# Patient Record
(demographics unavailable — no encounter records)

---

## 2025-06-11 NOTE — HISTORY OF PRESENT ILLNESS
[FreeTextEntry1] : Pt was in office 1 week ago for evaluation of abscess right breast  Patient has right axillary pain for about 3 days  Pt states right breast abscess continues to slightly drain  She was originally scheduled for ultrasound-guided core biopsy of right breast mass last week

## 2025-06-15 NOTE — PHYSICAL EXAM
[Normocephalic] : normocephalic [Atraumatic] : atraumatic [Supple] : supple [No Supraclavicular Adenopathy] : no supraclavicular adenopathy [Examined in the supine and seated position] : examined in the supine and seated position [No dominant masses] : no dominant masses left breast [No Nipple Retraction] : no left nipple retraction [No Nipple Discharge] : no left nipple discharge [No Axillary Lymphadenopathy] : no left axillary lymphadenopathy [No Edema] : no edema [No Rashes] : no rashes [No Ulceration] : no ulceration [de-identified] : Palpable mass lower inner right breast likely abscess

## 2025-06-15 NOTE — HISTORY OF PRESENT ILLNESS
[FreeTextEntry1] : Patient presents to the office for evaluation of abscess right breast lower inner quadrant  She was originally scheduled for ultrasound-guided core biopsy of the retroareolar right breast mass

## 2025-06-15 NOTE — PROCEDURE
[FreeTextEntry3] : After informed consent was obtained, sterile prep and drape was applied to the skin of the lower inner right breast  The abscess cavity was opened and drained  The patient tolerated the procedure well and was instructed in postop care

## 2025-06-15 NOTE — PHYSICAL EXAM
[Normocephalic] : normocephalic [Atraumatic] : atraumatic [Supple] : supple [No Supraclavicular Adenopathy] : no supraclavicular adenopathy [Examined in the supine and seated position] : examined in the supine and seated position [No dominant masses] : no dominant masses left breast [No Nipple Retraction] : no left nipple retraction [No Nipple Discharge] : no left nipple discharge [No Axillary Lymphadenopathy] : no left axillary lymphadenopathy [No Edema] : no edema [No Rashes] : no rashes [No Ulceration] : no ulceration [de-identified] : Palpable mass lower inner right breast likely abscess

## 2025-06-15 NOTE — ASSESSMENT
[FreeTextEntry1] :  right breast abscess  Patient instructed in wound care  Follow-up next week  A total of 45 minutes was spent in consultation

## 2025-07-16 NOTE — PROCEDURE
[FreeTextEntry3] : Ultrasound-guided core biopsy right breast  The patient has an indeterminate 1 cm hypoechoic retroareolar right breast mass  After informed consent was obtained, 1% lidocaine was infiltrated to the skin and multiple core biopsies were performed  A postbiopsy clip was placed  BI-RADS 4